# Patient Record
Sex: FEMALE | Race: WHITE
[De-identification: names, ages, dates, MRNs, and addresses within clinical notes are randomized per-mention and may not be internally consistent; named-entity substitution may affect disease eponyms.]

---

## 2020-12-03 ENCOUNTER — HOSPITAL ENCOUNTER (OUTPATIENT)
Dept: HOSPITAL 11 - JP.SDS | Age: 70
Discharge: HOME | End: 2020-12-03
Attending: SURGERY
Payer: MEDICARE

## 2020-12-03 DIAGNOSIS — Z98.890: ICD-10-CM

## 2020-12-03 DIAGNOSIS — Z87.19: ICD-10-CM

## 2020-12-03 DIAGNOSIS — I10: ICD-10-CM

## 2020-12-03 DIAGNOSIS — K20.90: Primary | ICD-10-CM

## 2020-12-03 DIAGNOSIS — Z88.8: ICD-10-CM

## 2020-12-03 PROCEDURE — 43239 EGD BIOPSY SINGLE/MULTIPLE: CPT

## 2020-12-03 PROCEDURE — 87081 CULTURE SCREEN ONLY: CPT

## 2020-12-13 NOTE — OR
DATE OF PROCEDURE:  12/03/2020

 

SURGEON:  Rosendo Cobb MD

 

PREOPERATIVE DIAGNOSIS:  Epigastric retrosternal discomfort status post partial gastrectomy.

 

POSTOPERATIVE DIAGNOSIS:  Mild inflammation at esophagogastric junction.

 

OPERATIVE PROCEDURE:  Upper gastrointestinal endoscopy with biopsies of gastric pouch for

CLOtest ([id number]).

 

ANESTHESIA:  IV sedation.

 

INDICATION FOR PROCEDURE:  A 70-year-old status post a partial gastrectomy in 2018 for

worsening reflux symptoms and poor gastric emptying.  She has recently developed some reflux-

type symptoms.  She has been on Protonix 40 mg a day for the last 3 days and already is

noticing decreasing symptoms.  The plan is to proceed with upper endoscopy with biopsies as

indicated.  Potential risks including bleeding and perforation were discussed and the

patient wishes to proceed.

 

DETAILS OF PROCEDURE:  The patient was taken to the operating room and placed in a left

lateral decubitus position.  IV sedation was administered after which the upper GI endoscope

was passed orally through the length of the esophagus, into the gastric pouch, and from

there through the gastrojejunostomy.

 

There were no areas of significant stenosis.  The only abnormality noted was some very mild

redness and friability of the area around the esophagogastric junction consistent with some

probable reflux.  There was no bleeding or ulceration.  Biopsies were obtained from the

gastric pouch and sent for CLOtest for H. pylori.  Minimal bleeding from the biopsy sites

was seen and the procedure then concluded.

 

It appeared at this point, the Protonix will be effective in terms of controlling the

symptoms.  The patient will be scheduled to follow up with Dorys Templeton in February.  At

that point if the symptoms remain adequately controlled, one might try de-escalating the

treatment, perhaps going to every other day with the Protonix or switching to H2 blocker

such as Pepcid.

 

 

 

 

Rosendo Cobb MD

DD:  12/12/2020 10:23:02

DT:  12/13/2020 11:35:54

Job #:  [id number]

## 2021-04-27 ENCOUNTER — HOSPITAL ENCOUNTER (EMERGENCY)
Dept: HOSPITAL 11 - JP.ED | Age: 71
Discharge: HOME | End: 2021-04-27
Payer: MEDICARE

## 2021-04-27 DIAGNOSIS — K21.9: ICD-10-CM

## 2021-04-27 DIAGNOSIS — J45.909: ICD-10-CM

## 2021-04-27 DIAGNOSIS — E03.9: ICD-10-CM

## 2021-04-27 DIAGNOSIS — Z79.899: ICD-10-CM

## 2021-04-27 DIAGNOSIS — K59.01: Primary | ICD-10-CM

## 2021-04-27 DIAGNOSIS — I10: ICD-10-CM

## 2021-04-27 DIAGNOSIS — Z88.8: ICD-10-CM

## 2021-04-27 NOTE — EDM.PDOC
ED HPI GENERAL MEDICAL PROBLEM





- General


Chief Complaint: Gastrointestinal Problem


Stated Complaint: BLOOD IN STOOL  AND WEAK


Time Seen by Provider: 04/27/21 11:50


Source of Information: Reports: Patient, Old Records, RN


History Limitations: Reports: No Limitations





- History of Present Illness


INITIAL COMMENTS - FREE TEXT/NARRATIVE: 





69 yo female presents with recent onset of crampy RLQ pain associated with lack 

of a recent BM. No nausea, vomiting or fever. Denies a hx of constipation. 


Onset: Today


Onset Date: 04/27/21


Duration: Hour(s):, Waxing/Waning


Location: Reports: Abdomen


Quality: Reports: Other (cramps)


Severity: Moderate


Improves with: Reports: None


Worsens with: Reports: Other (?time)


Context: Reports: Other (See HPI)


Associated Symptoms: Reports: No Other Symptoms


Treatments PTA: Reports: Other (see below) (none)





- Related Data


                                    Allergies











Allergy/AdvReac Type Severity Reaction Status Date / Time


 


valacyclovir HCl Allergy Unknown Cannot Verified 04/27/21 10:41





[From Valtrex]   Remember  











Home Meds: 


                                    Home Meds





Levothyroxine [Synthroid] 50 mcg PO DAILY 06/01/16 [History]


Ascorbic Acid 500 mg PO DAILY 03/04/20 [History]


Calcium Citrate/Vitamin D3 [Calcium Citrate + D] 2 each PO BID 03/04/20 

[History]


Cyanocobalamin (Vitamin B-12) [Vitamin B-12] 1,000 mcg SL DAILY 03/04/20 

[History]


Iron,Carbonyl/Ascorbic Acid [Vitron-C Tablet] 1 each PO DAILY 03/04/20 [History]


Multivitamin [Multi-Vitamin Daily] 1 each PO DAILY 03/04/20 [History]


Papaya [Papaya Enzyme] 1 each PO DAILY 03/04/20 [History]


Potassium Gluconate 99 mg PO DAILY 03/04/20 [History]


Vitamin B Complex [B Complex] 1 each PO DAILY 03/04/20 [History]


Pantoprazole Sodium [Protonix] 40 mg PO DAILY 11/30/20 [History]


Acetaminophen/Caffeine [Excedrin Tension Headache] 1 tab PO ASDIRECTED PRN 

04/27/21 [History]











Past Medical History


HEENT History: Reports: Allergic Rhinitis, Impaired Vision


Cardiovascular History: Reports: Hypertension


Respiratory History: Reports: Asthma


Gastrointestinal History: Reports: Colon Polyp, Diverticulosis, GERD, Other (See

 Below)


Other Gastrointestinal History: rectal bleeding, reports stomach was ruptured 

requiring surg


Genitourinary History: Reports: None


OB/GYN History: Reports: Dysfunctional Uterine Bleeding, Fibroids, Pregnancy


Musculoskeletal History: Reports: Osteoarthritis


Neurological History: Reports: Migraines, Vertigo


Psychiatric History: Reports: Anxiety, Depression


Endocrine/Metabolic History: Reports: Hypothyroidism


Hematologic History: Reports: Anemia, Blood Transfusion(s)


Dermatologic History: Reports: Eczema





- Infectious Disease History


Infectious Disease History: Reports: Chicken Pox, Measles, Mumps, Shingles





- Past Surgical History


HEENT Surgical History: Reports: Naso-Sinus Surgery, Tonsillectomy


Cardiovascular Surgical History: Reports: None


Respiratory Surgical History: Reports: None


GI Surgical History: Reports: Appendectomy, Cholecystectomy, Colonoscopy, EGD, 

Nissen Fundoplication, Other (See Below)


Other GI Surgeries/Procedures: partial gastrectomy in feb. 2018


Female  Surgical History: Reports: D&C, Hysterectomy, Oophorectomy


Endocrine Surgical History: Reports: None


Neurological Surgical History: Reports: None


Musculoskeletal Surgical History: Reports: Carpal Tunnel


Dermatological Surgical History: Reports: None





Social & Family History





- Family History


Family Medical History: No Pertinent Family History





- Tobacco Use


Tobacco Use Status *Q: Never Tobacco User





- Caffeine Use


Caffeine Use: Reports: None





- Recreational Drug Use


Recreational Drug Use: No





ED ROS GENERAL





- Review of Systems


Review Of Systems: See Below


Constitutional: Reports: No Symptoms


HEENT: Reports: No Symptoms


Respiratory: Reports: No Symptoms


Cardiovascular: Reports: No Symptoms


GI/Abdominal: Reports: Abdominal Pain (LLQ, crampy), Constipation.  Denies: 

Black Stool, Bloody Stool, Diarrhea, Nausea


Musculoskeletal: Reports: No Symptoms


Skin: Reports: No Symptoms


Neurological: Reports: No Symptoms





ED EXAM, GI/ABD





- Physical Exam


Exam: See Below


Exam Limited By: No Limitations


General Appearance: Alert, WD/WN, No Apparent Distress


Eyes: Bilateral: Normal Appearance


Ears: Hearing Grossly Normal


Nose: Normal Inspection, No Blood


Throat/Mouth: Normal Inspection, Normal Voice, No Airway Compromise


Head: Atraumatic, Normocephalic


Neck: Normal Inspection


Respiratory/Chest: No Respiratory Distress, Lungs Clear, Normal Breath Sounds, 

No Accessory Muscle Use


Cardiovascular: Regular Rate, Rhythm, No Edema


GI/Abdominal Exam: Soft, Non-Tender, No Distention


Extremities: Normal Inspection


Neurological: Alert, Oriented, CN II-XII Intact, Normal Cognition, No 

Motor/Sensory Deficits


Psychiatric: Normal Affect, Normal Mood





Course





- Vital Signs


Last Recorded V/S: 





                                Last Vital Signs











Temp  36.4 C   04/27/21 10:55


 


Pulse  64   04/27/21 10:55


 


Resp  18   04/27/21 10:55


 


BP  144/64 H  04/27/21 10:55


 


Pulse Ox  97   04/27/21 10:55














- Orders/Labs/Meds


Orders: 





                               Active Orders 24 hr











 Category Date Time Status


 


 Enema [RC] ASDIRECTED Care  04/27/21 10:58 Active














- Re-Assessments/Exams


Free Text/Narrative Re-Assessment/Exam: 





04/27/21 12:07


Good relief/results with tap water enema. 





Departure





- Departure


Time of Disposition: 12:07


Disposition: Home, Self-Care 01


Condition: Good


Clinical Impression: 


Constipation


Qualifiers:


 Constipation type: slow transit constipation Qualified Code(s): K59.01 - Slow 

transit constipation








- Discharge Information


*PRESCRIPTION DRUG MONITORING PROGRAM REVIEWED*: Not Applicable


*COPY OF PRESCRIPTION DRUG MONITORING REPORT IN PATIENT CANDICE: Not Applicable


Instructions:  Constipation, Adult, Easy-to-Read


Referrals: 


Rosendo Cobb MD [Primary Care Provider] - 


Additional Instructions: 


Use as much Miralax as you needed to keep your bowels regular. Follow the 

directions for how much water to take with this. If problem recurs discuss with 

your doctor. 





Sepsis Event Note (ED)





- Evaluation


Sepsis Screening Result: No Definite Risk





- Focused Exam


Vital Signs: 





                                   Vital Signs











  Temp Pulse Resp BP Pulse Ox


 


 04/27/21 10:55  36.4 C  64  18  144/64 H  97


 


 04/27/21 10:40  36.4 C  64  18  144/64 H  97














- My Orders


Last 24 Hours: 





My Active Orders





04/27/21 10:58


Enema [RC] ASDIRECTED 














- Assessment/Plan


Last 24 Hours: 





My Active Orders





04/27/21 10:58


Enema [RC] ASDIRECTED